# Patient Record
Sex: FEMALE | URBAN - NONMETROPOLITAN AREA
[De-identification: names, ages, dates, MRNs, and addresses within clinical notes are randomized per-mention and may not be internally consistent; named-entity substitution may affect disease eponyms.]

---

## 2017-02-23 ENCOUNTER — APPOINTMENT (RX ONLY)
Dept: URBAN - NONMETROPOLITAN AREA CLINIC 22 | Facility: CLINIC | Age: 27
Setting detail: DERMATOLOGY
End: 2017-02-23

## 2017-02-23 VITALS — HEIGHT: 60 IN | WEIGHT: 221 LBS

## 2017-02-23 DIAGNOSIS — L40.0 PSORIASIS VULGARIS: ICD-10-CM | Status: INADEQUATELY CONTROLLED

## 2017-02-23 DIAGNOSIS — L40.8 OTHER PSORIASIS: ICD-10-CM | Status: INADEQUATELY CONTROLLED

## 2017-02-23 PROBLEM — G40.909 EPILEPSY, UNSPECIFIED, NOT INTRACTABLE, WITHOUT STATUS EPILEPTICUS: Status: ACTIVE | Noted: 2017-02-23

## 2017-02-23 PROBLEM — M12.9 ARTHROPATHY, UNSPECIFIED: Status: ACTIVE | Noted: 2017-02-23

## 2017-02-23 PROCEDURE — ? PRESCRIPTION

## 2017-02-23 PROCEDURE — ? TREATMENT REGIMEN

## 2017-02-23 PROCEDURE — ? COUNSELING

## 2017-02-23 PROCEDURE — 99203 OFFICE O/P NEW LOW 30 MIN: CPT

## 2017-02-23 RX ORDER — TACROLIMUS 0.1 %
OINTMENT (GRAM) TOPICAL BID
Qty: 1 | Refills: 0 | COMMUNITY
Start: 2017-02-23

## 2017-02-23 RX ORDER — CALCIPOTRIENE AND BETAMETHASONE DIPROPIONATE 50; .5 UG/G; MG/G
SUSPENSION TOPICAL
Qty: 1 | Refills: 0 | COMMUNITY
Start: 2017-02-23

## 2017-02-23 RX ADMIN — Medication: at 19:10

## 2017-02-23 RX ADMIN — CALCIPOTRIENE AND BETAMETHASONE DIPROPIONATE: 50; .5 SUSPENSION TOPICAL at 19:05

## 2017-02-23 ASSESSMENT — LOCATION DETAILED DESCRIPTION DERM
LOCATION DETAILED: RIGHT ANTERIOR PROXIMAL THIGH
LOCATION DETAILED: RIGHT SUPERIOR PARIETAL SCALP
LOCATION DETAILED: RIGHT AXILLARY VAULT

## 2017-02-23 ASSESSMENT — LOCATION ZONE DERM
LOCATION ZONE: SCALP
LOCATION ZONE: LEG
LOCATION ZONE: AXILLAE

## 2017-02-23 ASSESSMENT — LOCATION SIMPLE DESCRIPTION DERM
LOCATION SIMPLE: SCALP
LOCATION SIMPLE: RIGHT THIGH
LOCATION SIMPLE: RIGHT AXILLARY VAULT

## 2017-02-23 NOTE — PROCEDURE: TREATMENT REGIMEN
Initiate Regimen: Protopic BID to axillary vault area until clear/smooth. Warned of SE Start Regimen: Protopic BID to axillary vault area until clear/smooth. Warned of SE

## 2017-02-23 NOTE — PROCEDURE: TREATMENT REGIMEN
Start Regimen: Apply Taclonex suspension BID until plaques are smooth on scalp and right leg, then q weekend. Pt will also use a zinc shampoo to help control scaling. If not improved at next appt, will consider adding dermasmoothe, to routine. Pts body plaques are very small and thin at this time, scalp is the worst area.

## 2017-02-23 NOTE — HPI: RASH (PSORIASIS)
Do You Have A Family History Of Psoriasis?: yes
Is This A New Presentation, Or A Follow-Up?: Psoriasis